# Patient Record
Sex: FEMALE | Race: WHITE | Employment: FULL TIME | URBAN - METROPOLITAN AREA
[De-identification: names, ages, dates, MRNs, and addresses within clinical notes are randomized per-mention and may not be internally consistent; named-entity substitution may affect disease eponyms.]

---

## 2017-05-12 LAB
HPV COMMENT: NORMAL
HPV TYPE 16: NOT DETECTED
HPV TYPE 18: NOT DETECTED
HPVOH (OTHER TYPES): NOT DETECTED

## 2025-01-02 ENCOUNTER — OFFICE VISIT (OUTPATIENT)
Age: 50
End: 2025-01-02

## 2025-01-02 VITALS
HEART RATE: 98 BPM | HEIGHT: 61 IN | OXYGEN SATURATION: 97 % | WEIGHT: 130 LBS | TEMPERATURE: 97.8 F | SYSTOLIC BLOOD PRESSURE: 104 MMHG | DIASTOLIC BLOOD PRESSURE: 80 MMHG | BODY MASS INDEX: 24.55 KG/M2

## 2025-01-02 DIAGNOSIS — K21.9 GASTROESOPHAGEAL REFLUX DISEASE WITHOUT ESOPHAGITIS: ICD-10-CM

## 2025-01-02 DIAGNOSIS — U07.1 COVID: Primary | ICD-10-CM

## 2025-01-02 DIAGNOSIS — J01.40 ACUTE NON-RECURRENT PANSINUSITIS: ICD-10-CM

## 2025-01-02 RX ORDER — IBUPROFEN 600 MG/1
600 TABLET, FILM COATED ORAL 3 TIMES DAILY PRN
Qty: 30 TABLET | Refills: 0 | Status: SHIPPED | OUTPATIENT
Start: 2025-01-02

## 2025-01-02 RX ORDER — ALBUTEROL SULFATE 90 UG/1
2 INHALANT RESPIRATORY (INHALATION) 4 TIMES DAILY PRN
Qty: 18 G | Refills: 0 | Status: SHIPPED | OUTPATIENT
Start: 2025-01-02

## 2025-01-02 RX ORDER — OMEPRAZOLE 40 MG/1
40 CAPSULE, DELAYED RELEASE ORAL
Qty: 30 CAPSULE | Refills: 0 | Status: SHIPPED | OUTPATIENT
Start: 2025-01-02

## 2025-01-02 RX ORDER — BROMPHENIRAMINE MALEATE, PSEUDOEPHEDRINE HYDROCHLORIDE, AND DEXTROMETHORPHAN HYDROBROMIDE 2; 30; 10 MG/5ML; MG/5ML; MG/5ML
5 SYRUP ORAL 4 TIMES DAILY PRN
Qty: 118 ML | Refills: 0 | Status: SHIPPED | OUTPATIENT
Start: 2025-01-02

## 2025-01-02 RX ORDER — GUAIFENESIN 600 MG/1
600 TABLET, EXTENDED RELEASE ORAL 2 TIMES DAILY
Qty: 30 TABLET | Refills: 0 | Status: SHIPPED | OUTPATIENT
Start: 2025-01-02 | End: 2025-01-17

## 2025-01-02 RX ORDER — IPRATROPIUM BROMIDE AND ALBUTEROL SULFATE 2.5; .5 MG/3ML; MG/3ML
1 SOLUTION RESPIRATORY (INHALATION) ONCE
Status: COMPLETED | OUTPATIENT
Start: 2025-01-02 | End: 2025-01-02

## 2025-01-02 RX ADMIN — IPRATROPIUM BROMIDE AND ALBUTEROL SULFATE 1 DOSE: 2.5; .5 SOLUTION RESPIRATORY (INHALATION) at 09:51

## 2025-01-02 NOTE — PATIENT INSTRUCTIONS
Discharge medications reviewed with the patient and appropriate educational materials and side effects teaching were provided.    Increase fluids (preferably with electrolytes) and rest.  Emergency follow up required for symptoms including, but not limited to, shortness of breath, chest pain, mental status change, fevers >101, difficulty or inability to swallow, dehydration, or if symptoms worsen.  See printed instructions given at discharge.

## 2025-01-02 NOTE — PROGRESS NOTES
Madhavi Swartz (:  1975) is a 49 y.o. female,New patient, here for evaluation of the following chief complaint(s):  Cough (Cough , body aches , headache x 3 days)      Assessment & Plan :  Visit Diagnoses and Associated Orders       COVID    -  Primary    molnupiravir 200 MG capsule [[id number]]      brompheniramine-pseudoephedrine-DM 2-30-10 MG/5ML syrup [33631]      ibuprofen (ADVIL;MOTRIN) 600 MG tablet [3844]      ipratropium 0.5 mg-albuterol 2.5 mg (DUONEB) nebulizer solution 1 Dose [71216]      albuterol sulfate HFA (VENTOLIN HFA) 108 (90 Base) MCG/ACT inhaler [32034]           Gastroesophageal reflux disease without esophagitis        omeprazole (PRILOSEC) 40 MG delayed release capsule [01121]           Acute non-recurrent pansinusitis        brompheniramine-pseudoephedrine-DM 2-30-10 MG/5ML syrup [00896]      ibuprofen (ADVIL;MOTRIN) 600 MG tablet [3844]                 Increase fluids (preferably with electrolytes) and rest.  Emergency follow up required for symptoms including, but not limited to, shortness of breath, chest pain, mental status change, fevers >101, difficulty or inability to swallow, dehydration, or if symptoms worsen.  See printed instructions given at discharge.        Subjective :  Cough (Cough , body aches , headache x 3 days)  Positive home covid test. This provider visualized test.      History provided by:  Patient    HPI:   49 y.o. female presents with symptoms of Covid and sinusitis. Pt with hx of GERD, but out of medicine.         Vitals:    25 0920   BP: 104/80   Site: Right Upper Arm   Position: Sitting   Cuff Size: Small Adult   Pulse: 98   Temp: 97.8 °F (36.6 °C)   TempSrc: Oral   SpO2: 97%   Weight: 59 kg (130 lb)   Height: 1.549 m (5' 1\")          Objective   Physical Exam  Constitutional:       General: She is not in acute distress.     Appearance: Normal appearance.   HENT:      Right Ear: External ear normal. Tympanic membrane is bulging.      Left Ear:

## 2025-04-21 ENCOUNTER — OFFICE VISIT (OUTPATIENT)
Age: 50
End: 2025-04-21

## 2025-04-21 VITALS
HEIGHT: 62 IN | WEIGHT: 127.2 LBS | BODY MASS INDEX: 23.41 KG/M2 | TEMPERATURE: 98.2 F | SYSTOLIC BLOOD PRESSURE: 105 MMHG | HEART RATE: 129 BPM | OXYGEN SATURATION: 95 % | DIASTOLIC BLOOD PRESSURE: 71 MMHG

## 2025-04-21 DIAGNOSIS — J02.9 SORE THROAT: Primary | ICD-10-CM

## 2025-04-21 DIAGNOSIS — J02.0 STREP PHARYNGITIS: ICD-10-CM

## 2025-04-21 LAB — S PYO AG THROAT QL: POSITIVE

## 2025-04-21 RX ORDER — AMOXICILLIN 500 MG/1
500 CAPSULE ORAL 2 TIMES DAILY
Qty: 20 CAPSULE | Refills: 0 | Status: SHIPPED | OUTPATIENT
Start: 2025-04-21 | End: 2025-05-01

## 2025-04-21 NOTE — PATIENT INSTRUCTIONS
Prescription for amoxicillin twice daily for 10 days. Take until medication is completed.  Use warm salt water gargle, Chloraseptic spray, or cough drops.  Change toothbrush midway through to prevent reinfection. Push oral fluids. Use over-the-counter Tylenol and Motrin for pain or fever. Follow-up with their PCP in 3 to 5 days and worsening symptoms.

## 2025-04-21 NOTE — PROGRESS NOTES
Madhavi Swartz (:  1975) is a 49 y.o. female,New patient, here for evaluation of the following chief complaint(s):  Pharyngitis (Patient c/o sore throat, chills and body ache for 3 days)      Assessment & Plan :  Visit Diagnoses and Associated Orders         Sore throat    -  Primary    POCT rapid strep A [15143 Custom]             Strep pharyngitis        amoxicillin (AMOXIL) 500 MG capsule [451]                   Patient seen and evaluated for the above symptoms.  Assessment consistent with streptococcal pharyngitis.  Patient is provided a prescription for amoxicillin.  Instructed to use warm salt water gargle, Chloraseptic spray, or cough drops.  Instructed to clean or change toothbrush midway through to prevent reinfection.  Instructed to push oral fluids. The Patient is instructed to use over-the-counter Tylenol and Motrin for pain or fever.  Instructed to follow-up with their PCP in 3 to 5 days and worsening symptoms.  The patient is agreeable with the above plan and denies questions or concerns at this time.         Subjective :    Pharyngitis       49 y.o. female presents with symptoms of sore throat. Patient reports onset of symptoms 3 days ago. Reports, her daughter recently tested positive for strep throat.  She report in addition to sore throat, she is experiencing chills and body aches. Denies any known fevers. Endorse in overall malaise.          Vitals:    25 0836   BP: 105/71   BP Site: Right Upper Arm   Patient Position: Sitting   BP Cuff Size: Small Adult   Pulse: (!) 129   Temp: 98.2 °F (36.8 °C)   TempSrc: Oral   SpO2: 95%   Weight: 57.7 kg (127 lb 3.2 oz)   Height: 1.575 m (5' 2\")       Results for orders placed or performed in visit on 25   POCT rapid strep A   Result Value Ref Range    Strep A Ag Positive (A) None Detected         Objective   Physical Exam  Vitals and nursing note reviewed.   Constitutional:       General: She is not in acute distress.     Appearance: